# Patient Record
Sex: FEMALE | Race: WHITE | NOT HISPANIC OR LATINO | Employment: FULL TIME | URBAN - METROPOLITAN AREA
[De-identification: names, ages, dates, MRNs, and addresses within clinical notes are randomized per-mention and may not be internally consistent; named-entity substitution may affect disease eponyms.]

---

## 2019-01-12 ENCOUNTER — APPOINTMENT (OUTPATIENT)
Dept: CT IMAGING | Facility: CLINIC | Age: 39
End: 2019-01-12
Payer: COMMERCIAL

## 2019-01-12 ENCOUNTER — HOSPITAL ENCOUNTER (EMERGENCY)
Facility: CLINIC | Age: 39
Discharge: HOME | End: 2019-01-12
Attending: EMERGENCY MEDICINE | Admitting: EMERGENCY MEDICINE
Payer: COMMERCIAL

## 2019-01-12 VITALS
BODY MASS INDEX: 31.84 KG/M2 | HEIGHT: 69 IN | SYSTOLIC BLOOD PRESSURE: 135 MMHG | DIASTOLIC BLOOD PRESSURE: 82 MMHG | WEIGHT: 215 LBS | HEART RATE: 104 BPM | TEMPERATURE: 98.1 F | OXYGEN SATURATION: 97 % | RESPIRATION RATE: 18 BRPM

## 2019-01-12 DIAGNOSIS — R19.7 NAUSEA VOMITING AND DIARRHEA: ICD-10-CM

## 2019-01-12 DIAGNOSIS — R11.2 NAUSEA VOMITING AND DIARRHEA: ICD-10-CM

## 2019-01-12 LAB
ALBUMIN SERPL-MCNC: 3.7 G/DL (ref 3.4–5)
ALBUMIN UR-MCNC: 30 MG/DL
ALP SERPL-CCNC: 79 U/L (ref 40–150)
ALT SERPL W P-5'-P-CCNC: 65 U/L (ref 0–50)
ANION GAP SERPL CALCULATED.3IONS-SCNC: 9 MMOL/L (ref 3–14)
ANISOCYTOSIS BLD QL SMEAR: SLIGHT
APPEARANCE UR: CLEAR
AST SERPL W P-5'-P-CCNC: 67 U/L (ref 0–45)
B-HCG FREE SERPL-ACNC: <5 IU/L
BASOPHILS # BLD AUTO: 0 10E9/L (ref 0–0.2)
BASOPHILS NFR BLD AUTO: 0.3 %
BILIRUB SERPL-MCNC: 0.7 MG/DL (ref 0.2–1.3)
BILIRUB UR QL STRIP: NEGATIVE
BUN SERPL-MCNC: 11 MG/DL (ref 7–30)
CALCIUM SERPL-MCNC: 8 MG/DL (ref 8.5–10.1)
CHLORIDE SERPL-SCNC: 102 MMOL/L (ref 94–109)
CO2 SERPL-SCNC: 22 MMOL/L (ref 20–32)
COLOR UR AUTO: YELLOW
CREAT SERPL-MCNC: 0.54 MG/DL (ref 0.52–1.04)
DIFFERENTIAL METHOD BLD: ABNORMAL
EOSINOPHIL # BLD AUTO: 0 10E9/L (ref 0–0.7)
EOSINOPHIL NFR BLD AUTO: 0 %
ERYTHROCYTE [DISTWIDTH] IN BLOOD BY AUTOMATED COUNT: 18.2 % (ref 10–15)
GFR SERPL CREATININE-BSD FRML MDRD: >90 ML/MIN/{1.73_M2}
GLUCOSE SERPL-MCNC: 185 MG/DL (ref 70–99)
GLUCOSE UR STRIP-MCNC: NEGATIVE MG/DL
HCT VFR BLD AUTO: 34.4 % (ref 35–47)
HGB BLD-MCNC: 9.3 G/DL (ref 11.7–15.7)
HGB UR QL STRIP: ABNORMAL
IMM GRANULOCYTES # BLD: 0.1 10E9/L (ref 0–0.4)
IMM GRANULOCYTES NFR BLD: 0.8 %
KETONES UR STRIP-MCNC: 20 MG/DL
LEUKOCYTE ESTERASE UR QL STRIP: NEGATIVE
LIPASE SERPL-CCNC: 82 U/L (ref 73–393)
LYMPHOCYTES # BLD AUTO: 0.3 10E9/L (ref 0.8–5.3)
LYMPHOCYTES NFR BLD AUTO: 5.5 %
MCH RBC QN AUTO: 18.2 PG (ref 26.5–33)
MCHC RBC AUTO-ENTMCNC: 27 G/DL (ref 31.5–36.5)
MCV RBC AUTO: 68 FL (ref 78–100)
MICROCYTES BLD QL SMEAR: PRESENT
MONOCYTES # BLD AUTO: 0.4 10E9/L (ref 0–1.3)
MONOCYTES NFR BLD AUTO: 6 %
MUCOUS THREADS #/AREA URNS LPF: PRESENT /LPF
NEUTROPHILS # BLD AUTO: 5.4 10E9/L (ref 1.6–8.3)
NEUTROPHILS NFR BLD AUTO: 87.4 %
NITRATE UR QL: NEGATIVE
NRBC # BLD AUTO: 0 10*3/UL
NRBC BLD AUTO-RTO: 0 /100
PH UR STRIP: 5 PH (ref 5–7)
PLATELET # BLD AUTO: 282 10E9/L (ref 150–450)
PLATELET # BLD EST: ABNORMAL 10*3/UL
POTASSIUM SERPL-SCNC: 3.5 MMOL/L (ref 3.4–5.3)
PROT SERPL-MCNC: 7.6 G/DL (ref 6.8–8.8)
RBC # BLD AUTO: 5.1 10E12/L (ref 3.8–5.2)
RBC #/AREA URNS AUTO: 1 /HPF (ref 0–2)
SODIUM SERPL-SCNC: 133 MMOL/L (ref 133–144)
SOURCE: ABNORMAL
SP GR UR STRIP: 1.03 (ref 1–1.03)
SQUAMOUS #/AREA URNS AUTO: <1 /HPF (ref 0–1)
UROBILINOGEN UR STRIP-MCNC: 0 MG/DL (ref 0–2)
WBC # BLD AUTO: 6.1 10E9/L (ref 4–11)
WBC #/AREA URNS AUTO: 1 /HPF (ref 0–5)

## 2019-01-12 PROCEDURE — 74177 CT ABD & PELVIS W/CONTRAST: CPT

## 2019-01-12 PROCEDURE — 25000128 H RX IP 250 OP 636: Performed by: EMERGENCY MEDICINE

## 2019-01-12 PROCEDURE — 99285 EMERGENCY DEPT VISIT HI MDM: CPT | Mod: 25

## 2019-01-12 PROCEDURE — 96361 HYDRATE IV INFUSION ADD-ON: CPT

## 2019-01-12 PROCEDURE — 83690 ASSAY OF LIPASE: CPT | Performed by: EMERGENCY MEDICINE

## 2019-01-12 PROCEDURE — 81001 URINALYSIS AUTO W/SCOPE: CPT | Performed by: EMERGENCY MEDICINE

## 2019-01-12 PROCEDURE — 93005 ELECTROCARDIOGRAM TRACING: CPT

## 2019-01-12 PROCEDURE — 85025 COMPLETE CBC W/AUTO DIFF WBC: CPT | Performed by: EMERGENCY MEDICINE

## 2019-01-12 PROCEDURE — 25000132 ZZH RX MED GY IP 250 OP 250 PS 637: Performed by: EMERGENCY MEDICINE

## 2019-01-12 PROCEDURE — 84702 CHORIONIC GONADOTROPIN TEST: CPT

## 2019-01-12 PROCEDURE — 96375 TX/PRO/DX INJ NEW DRUG ADDON: CPT

## 2019-01-12 PROCEDURE — 80053 COMPREHEN METABOLIC PANEL: CPT | Performed by: EMERGENCY MEDICINE

## 2019-01-12 PROCEDURE — 96374 THER/PROPH/DIAG INJ IV PUSH: CPT | Mod: 59

## 2019-01-12 RX ORDER — LOPERAMIDE HCL 2 MG
4 CAPSULE ORAL ONCE
Status: COMPLETED | OUTPATIENT
Start: 2019-01-12 | End: 2019-01-12

## 2019-01-12 RX ORDER — ONDANSETRON 4 MG/1
4 TABLET, ORALLY DISINTEGRATING ORAL EVERY 8 HOURS PRN
Qty: 10 TABLET | Refills: 0 | Status: SHIPPED | OUTPATIENT
Start: 2019-01-12 | End: 2019-01-15

## 2019-01-12 RX ORDER — HYDROMORPHONE HYDROCHLORIDE 1 MG/ML
0.5 INJECTION, SOLUTION INTRAMUSCULAR; INTRAVENOUS; SUBCUTANEOUS
Status: DISCONTINUED | OUTPATIENT
Start: 2019-01-12 | End: 2019-01-12 | Stop reason: HOSPADM

## 2019-01-12 RX ORDER — KETOROLAC TROMETHAMINE 30 MG/ML
30 INJECTION, SOLUTION INTRAMUSCULAR; INTRAVENOUS ONCE
Status: COMPLETED | OUTPATIENT
Start: 2019-01-12 | End: 2019-01-12

## 2019-01-12 RX ORDER — LOPERAMIDE HYDROCHLORIDE 2 MG/1
2 TABLET ORAL 4 TIMES DAILY PRN
Qty: 30 TABLET | Refills: 0 | Status: SHIPPED | OUTPATIENT
Start: 2019-01-12 | End: 2019-01-22

## 2019-01-12 RX ORDER — IOPAMIDOL 755 MG/ML
500 INJECTION, SOLUTION INTRAVASCULAR ONCE
Status: COMPLETED | OUTPATIENT
Start: 2019-01-12 | End: 2019-01-12

## 2019-01-12 RX ORDER — ONDANSETRON 2 MG/ML
4 INJECTION INTRAMUSCULAR; INTRAVENOUS
Status: DISCONTINUED | OUTPATIENT
Start: 2019-01-12 | End: 2019-01-12 | Stop reason: HOSPADM

## 2019-01-12 RX ADMIN — KETOROLAC TROMETHAMINE 30 MG: 30 INJECTION, SOLUTION INTRAMUSCULAR at 19:34

## 2019-01-12 RX ADMIN — IOPAMIDOL 100 ML: 755 INJECTION, SOLUTION INTRAVENOUS at 19:05

## 2019-01-12 RX ADMIN — SODIUM CHLORIDE 65 ML: 9 INJECTION, SOLUTION INTRAVENOUS at 19:05

## 2019-01-12 RX ADMIN — ONDANSETRON 4 MG: 2 INJECTION INTRAMUSCULAR; INTRAVENOUS at 16:56

## 2019-01-12 RX ADMIN — SODIUM CHLORIDE 1000 ML: 9 INJECTION, SOLUTION INTRAVENOUS at 16:58

## 2019-01-12 RX ADMIN — LOPERAMIDE HYDROCHLORIDE 4 MG: 2 CAPSULE ORAL at 20:02

## 2019-01-12 RX ADMIN — Medication 0.5 MG: at 16:58

## 2019-01-12 SDOH — HEALTH STABILITY: MENTAL HEALTH: HOW OFTEN DO YOU HAVE A DRINK CONTAINING ALCOHOL?: NEVER

## 2019-01-12 ASSESSMENT — ENCOUNTER SYMPTOMS
BLOOD IN STOOL: 0
ABDOMINAL PAIN: 1
DIARRHEA: 1
NAUSEA: 1
VOMITING: 1
FEVER: 0
FATIGUE: 1

## 2019-01-12 ASSESSMENT — MIFFLIN-ST. JEOR: SCORE: 1719.61

## 2019-01-12 NOTE — ED TRIAGE NOTES
ABCs intact. Pt c/o fever, abdominal pain, n/v/d x 1 week. Denies black or bloody vomit or stool. Denies urinary symptoms.

## 2019-01-12 NOTE — ED AVS SNAPSHOT
[facility] Emergency Department  [address]  [facility] [address]  Phone:  [phone]  Fax:  [phone]                                    Sherie Carpenter   MRN: [id number]    Department:  [facility] Emergency Department   Date of Visit:  1/12/2019           After Visit Summary Signature Page    I have received my discharge instructions, and my questions have been answered. I have discussed any challenges I see with this plan with the nurse or doctor.    ..........................................................................................................................................  Patient/Patient Representative Signature      ..........................................................................................................................................  Patient Representative Print Name and Relationship to Patient    ..................................................               ................................................  Date                                   Time    ..........................................................................................................................................  Reviewed by Signature/Title    ...................................................              ..............................................  Date                                               Time          22EPIC Rev 08/18

## 2019-01-12 NOTE — ED PROVIDER NOTES
"  History     Chief Complaint:  Abdominal Pain    HPI   Sherie Carpenter is a 38 year old female who presents with her  to the ED for evaluation of abdominal pain. The patient reports she developed diarrhea (watery and looser stools) a week ago with nausea and vomiting yesterday. She has also been feeling fatigued with \"disorientation\" yesterday. Her diarrhea has been constant from 8:00AM-3:00PM today. The patient notes she developed intermittent stabbing abdominal pain at 3:00AM today. She has not eaten since 8:00PM yesterday and only had 4 sips of water today. She has not taken medication for her symptoms. The patient's  reports she ate vegetable soup for dinner at 6:00PM yesterday. She vomited up the food with green bile afterwards. She was seen at [facility] urgent care today and advised to visit the ED. Her temp at urgent care was 101. The patient denies any recent antibiotics, travel, hospitalization, sick contacts, hematemesis, blood in stool, mucousy stool, or urinary symptoms. Of note, the patient reports she stopped taking her Metformin 2 days ago after a recent increase in dose. She thought her diarrhea was due to the Metformin because she has a history of diarrhea with change in Metformin dose before.     Allergies:  No known drug allergies    Medications:    Metformin  Levothyroxine      Past Medical History:    Prediabetes   Hypothyroidism      Past Surgical History:         Family History:    History reviewed. No pertinent family history.     Social History:  Smoking status: Never smoker    Alcohol use: No  Presents to ED with        Review of Systems   Constitutional: Positive for fatigue. Negative for fever.   Gastrointestinal: Positive for abdominal pain, diarrhea, nausea and vomiting. Negative for blood in stool.   Genitourinary: Negative.    All other systems reviewed and are negative.    Physical Exam     Patient Vitals for the past 24 hrs:   BP Temp " "Temp src Pulse Resp SpO2 Height Weight   01/12/19 1830 135/82 -- -- 104 -- 97 % -- --   01/12/19 1800 123/76 -- -- 101 -- 92 % -- --   01/12/19 1730 121/80 -- -- 108 -- 94 % -- --   01/12/19 1700 138/83 -- -- -- -- -- -- --   01/12/19 1627 133/89 98.1  F (36.7  C) Temporal 119 18 96 % 1.753 m (5' 9\") 97.5 kg (215 lb)     Physical Exam  General: Alert, no acute distress; nontoxic appearing  Neuro:  PERRL.  EOMI.  Gait stable, no focal deficits  HEENT:  Dr mucous membranes.  Posterior oropharynx clear, no exudates.  Conjunctiva normal.   CV:  Tachycardic, regular rhythm, no m/r/g, skin warm and well perfused  Pulm:  CTAB, no wheezes/ronchi/rales.  No acute distress, breathing comfortably  GI:  Soft, mild epigastric/suprapubic tenderness, nondistended.  No rebound or guarding.  Normal bowel sounds  MSK:  Moving all extremities.  No focal areas of edema, erythema, or tenderness  Skin:  WWP, no rashes, no lower extremity edema, skin color normal, no diaphoresis  Psych:  Well-appearing, normal affect, regular speech, organized and appropriate thought content    Emergency Department Course     Imaging:  Radiographic findings were communicated with the patient and family who voiced understanding of the findings.    Abd/Pelvis CT, IV Contrast Only Trauma/AAA  IMPRESSION:  1. Small fluid distends a few small bowel loops in the colon. Question  if this relates to an enteritis. No evidence for diverticulitis or  other acute bowel abnormality.  2. Splenomegaly.  3. Nonspecific right adrenal nodule.  As read by Radiology.     Laboratory:  ISTAT HCG Pregnancy POCT (1654): <5.0    Lipase: 82  CBC: HGB 9.3(L), o/w WNL (WBC 6.1, )  CMP: Glucose 85(H), Calcium 8.0(L), ALT 65(H), AST 67(H), o/w WNL (Creatinine 0.54)     UA: Urineketon 20, Blood small, Protein albumin 30, Mucous present, o/w Negative     Interventions:  1656: Zofran 4mg IV  1658: NS 1L Bolus IV  1658: Dilaudid 0.5mg IV  1934: Toradol 30mg IV  2002: Imodium 4mg " PO    Emergency Department Course:  Past medical records, nursing notes, and vitals reviewed.  1636: I performed an exam of the patient and obtained history, as documented above.    IV inserted and blood drawn.    The patient was sent for an abdomen pelvis CT while in the emergency department, findings above.    1927: I rechecked the patient. Explained findings to patient and .    Findings and plan explained to the Patient and spouse. Patient discharged home with instructions regarding supportive care, medications, and reasons to return. The importance of close follow-up was reviewed.     Impression & Plan      Medical Decision Making:  Sherie Carpenter is a 38 year old female who presents for evaluation of nausea, vomiting, and, diarrhea with abdominal pain in a nonfocal abdominal exam.  There are no ill contacts.  I considered a broad differential diagnosis for this patient including viral gastroenteritis, bacterial infection of the large intestine (salmonella, shigella, campylobacter, e coli, etc), bowel obstruction, intra-abdominal infection such as colitis, food poisoning, cholecystitis, UTI, pyelonephritis, appendicitis, etc.  There are no signs of worrisome intra-abdominal pathologies detected during the visit today; CT scan shows enteritis.  She has a completely benign abdominal exam without rebound, guarding, or marked tenderness to palpation.  Supportive outpatient management is therefore indicated.    No indication for stool studies at this time. It was discussed to return to the ED for blood in stool, increasing pain, or fevers more than 102.   Feels much improved after interventions in ED.     Diagnosis:    ICD-10-CM   1. Nausea vomiting and diarrhea R11.2    R19.7     Disposition: Patient discharged to home with       Discharge Medications:  loperamide 2 MG tablet  Commonly known as:  IMODIUM A-D  2 mg, Oral, 4 TIMES DAILY PRN     ondansetron 4 MG ODT tab  Commonly known as:   ZOFRAN ODT  4 mg, Oral, EVERY 8 HOURS PRN       Magdalena Han  1/12/2019   [facility] EMERGENCY DEPARTMENT    Scribe Disclosure:  I, Magdalena Han, am serving as a scribe at 4:36 PM on 1/12/2019 to document services personally performed by Yony Newman MD based on my observations and the provider's statements to me.        Yony Newman MD  01/12/19 2874

## 2019-01-12 NOTE — LETTER
January 12, 2019      To Whom It May Concern:      Sherie Radha Carpenter was seen in our Emergency Department today, 01/12/19.  I expect her condition to improve over the next 1-3 days.  She may return to work when improved.    Sincerely,        Abner Michael RN

## 2019-01-13 NOTE — DISCHARGE INSTRUCTIONS
Discharge Instructions  Adult Diarrhea    You have been seen today for diarrhea (loose stools). This is usually caused by a virus, but some bacteria, parasites, medicines, or other medical conditions can cause similar symptoms. At this time your provider does not find that your diarrhea is a sign of anything dangerous or life-threatening. However, sometimes the signs of serious illness do not show up right away. If you have new or worse symptoms, you may need to be seen again in the Emergency Department or by your primary provider.     Generally, every Emergency Department visit should have a follow-up clinic visit with either a primary or a specialty clinic/provider. Please follow-up as instructed by your emergency provider today.    Return to the Emergency Department if:  You feel you are getting dehydrated, such as being very thirsty, not urinating (peeing) like usual, or feeling faint or lightheaded.   You develop a new fever.  You have abdominal (belly) pain that seems worse than cramps, is in one spot, or is getting worse over time.   You have blood in your stool or your stool becomes black.  (Remember that if you take Pepto-Bismol , this will turn your stool black).   You feel very weak.    What can I do to help myself?  The most important thing to do is to drink clear liquids.   It is best to have only small, frequent sips of liquids. Drinking too much at once may cause more diarrhea. You should also replace minerals, sodium and potassium lost with diarrhea. Pedialyte  and sports drinks can help you replace these minerals. You can also drink clear liquids such as water, weak tea, apple juice, and 7-Up . Avoid acidic liquids (orange juice), caffeine (coffee) or alcohol. Milk products will make the diarrhea worse.  Eat bland (plain) foods. Soda crackers, toast, plain noodles, gelatin, applesauce and bananas are good first choices. Avoid foods that have acid, are spicy, fatty or fibrous (such as meats, coarse  "grains, vegetables). You may start eating these foods again in about 3 days when you are better.   Sometimes treatment includes prescription medicine to prevent diarrhea. If your provider prescribes these for you, take them as directed.   Nonprescription medicine is available for the treatment of diarrhea and can be very effective. If you use it, make sure you use the dose recommended on the package. Check with your healthcare provider before you use any medicine for diarrhea.   Do not take ibuprofen, or other nonsteroidal anti-inflammatory medicines, without checking with your healthcare provider.   Probiotics: If you have been given an antibiotic, you may want to also take a probiotic pill or eat yogurt with live cultures. Probiotics have \"good bacteria\" to help your intestines stay healthy. Studies have shown that probiotics help prevent diarrhea and other intestine problems (including C. diff infection) when you take antibiotics. You can buy these without a prescription in the pharmacy section of the store.   If you were given a prescription for medicine here today, be sure to read all of the information (including the package insert) that comes with your prescription.  This will include important information about the medicine, its side effects, and any warnings that you need to know about.  The pharmacist who fills the prescription can provide more information and answer questions you may have about the medicine.  If you have questions or concerns that the pharmacist cannot address, please call or return to the Emergency Department.  Remember that you can always come back to the Emergency Department if you are not able to see your regular provider in the amount of time listed above, if you get any new symptoms, or if there is anything that worries you.      Discharge Instructions  Vomiting    You have been seen today for vomiting (throwing up). This is usually caused by a virus, but some bacteria, parasites, " medicines or other medical conditions can cause similar symptoms. At this time your provider does not find that your vomiting is a sign of anything dangerous or life-threatening. However, sometimes the signs of serious illness do not show up right away. If you have new or worse symptoms, you may need to be seen again in the Emergency Department or by your primary provider. Remember that serious problems like appendicitis can start as vomiting.    Generally, every Emergency Department visit should have a follow-up clinic visit with either a primary or a specialty clinic/provider. Please follow-up as instructed by your emergency provider today.    Return to the Emergency Department if:  You keep vomiting and you are not able to keep liquids down.   You feel you are getting dehydrated, such as being very thirsty, not urinating (peeing) at least every 8-12 hours, or feeling faint or lightheaded.   You develop a new fever, or your fever continues for more than 2 days.   You have abdominal (belly pain) that seems worse than cramps, is in one spot, or is getting worse over time. Appendicitis usually causes pain in the right lower abdomen (to the right and below your belly button) so watch for pain in this location.  You have blood in your vomit or stools.   You feel very weak.  You are not starting to improve within 24 hours of your visit here.     What can I do to help myself?  The most important thing to do is to drink clear liquids. If you have been vomiting a lot, it is best to have only small, frequent sips of liquids. Drinking too much at once may cause more vomiting. If you are vomiting often, you must replace minerals, sodium and potassium lost with your illness. Pedialyte  is the best available rehydration liquid but some find that it doesn?t taste good so sports drinks are an alterative. You can also drink clear liquids such as water, weak tea, apple juice, and 7-Up . Avoid acid liquids (orange), caffeine  (coffee) or alcohol. Do not drink milk until you no longer have diarrhea (loose stools).   After liquids are staying down, you may start eating mild foods. Soda crackers, toast, plain noodles, gelatin, applesauce and bananas are good first choices. Avoid foods that have acid, are spicy, fatty or have a lot of fiber (such as meats, coarse grains, vegetables). You may start eating these foods again in about 3 days when you are better.   Sometimes treatment includes prescription medicine to prevent nausea (sick to your stomach) and vomiting. If your provider prescribes these for you, take them as directed.   Do not take ibuprofen, naproxen, or other nonsteroidal anti-inflammatory (NSAID) medicines without checking with your healthcare provider.     If you were given a prescription for medicine here today, be sure to read all of the information (including the package insert) that comes with your prescription.  This will include important information about the medicine, its side effects, and any warnings that you need to know about.  The pharmacist who fills the prescription can provide more information and answer questions you may have about the medicine.  If you have questions or concerns that the pharmacist cannot address, please call or return to the Emergency Department.     Remember that you can always come back to the Emergency Department if you are not able to see your regular provider in the amount of time listed above, if you get any new symptoms, or if there is anything that worries you.

## 2023-06-01 ENCOUNTER — TRANSFERRED RECORDS (OUTPATIENT)
Dept: SURGERY | Facility: CLINIC | Age: 43
End: 2023-06-01
Payer: COMMERCIAL

## 2023-06-02 ENCOUNTER — OFFICE VISIT (OUTPATIENT)
Dept: SURGERY | Facility: CLINIC | Age: 43
End: 2023-06-02
Payer: COMMERCIAL

## 2023-06-02 ENCOUNTER — TELEPHONE (OUTPATIENT)
Dept: SURGERY | Facility: CLINIC | Age: 43
End: 2023-06-02

## 2023-06-02 VITALS
OXYGEN SATURATION: 97 % | SYSTOLIC BLOOD PRESSURE: 104 MMHG | WEIGHT: 200 LBS | HEART RATE: 69 BPM | BODY MASS INDEX: 29.62 KG/M2 | RESPIRATION RATE: 14 BRPM | DIASTOLIC BLOOD PRESSURE: 70 MMHG | HEIGHT: 69 IN

## 2023-06-02 DIAGNOSIS — K80.20 GALLSTONES: Primary | ICD-10-CM

## 2023-06-02 PROCEDURE — 99204 OFFICE O/P NEW MOD 45 MIN: CPT | Performed by: SURGERY

## 2023-06-02 RX ORDER — LEVOTHYROXINE SODIUM 175 UG/1
1 TABLET ORAL DAILY
COMMUNITY
Start: 2023-03-23

## 2023-06-02 RX ORDER — PROCHLORPERAZINE 25 MG/1
SUPPOSITORY RECTAL
COMMUNITY
Start: 2022-12-01

## 2023-06-02 RX ORDER — HYDROXYZINE HYDROCHLORIDE 25 MG/1
25 TABLET, FILM COATED ORAL PRN
COMMUNITY
Start: 2022-07-19

## 2023-06-02 RX ORDER — NAPROXEN 500 MG/1
500 TABLET ORAL PRN
COMMUNITY
Start: 2021-06-16

## 2023-06-02 RX ORDER — ESCITALOPRAM OXALATE 20 MG/1
1 TABLET ORAL
COMMUNITY
Start: 2023-02-16

## 2023-06-02 RX ORDER — BUSPIRONE HYDROCHLORIDE 5 MG/1
5 TABLET ORAL 2 TIMES DAILY
COMMUNITY
Start: 2023-06-01

## 2023-06-02 RX ORDER — GLIMEPIRIDE 2 MG/1
TABLET ORAL
COMMUNITY
Start: 2023-03-23

## 2023-06-02 RX ORDER — TRAZODONE HYDROCHLORIDE 50 MG/1
50 TABLET, FILM COATED ORAL PRN
COMMUNITY
Start: 2022-10-18

## 2023-06-02 RX ORDER — PROCHLORPERAZINE 25 MG/1
SUPPOSITORY RECTAL
COMMUNITY
Start: 2023-03-31

## 2023-06-02 RX ORDER — DICYCLOMINE HCL 20 MG
20 TABLET ORAL PRN
COMMUNITY

## 2023-06-02 RX ORDER — METOPROLOL SUCCINATE 100 MG/1
1 TABLET, EXTENDED RELEASE ORAL DAILY
COMMUNITY
Start: 2021-12-16

## 2023-06-02 RX ORDER — FERROUS SULFATE 325(65) MG
325 TABLET ORAL 2 TIMES DAILY
COMMUNITY
Start: 2023-03-30

## 2023-06-02 RX ORDER — BUPROPION HYDROCHLORIDE 300 MG/1
300 TABLET ORAL DAILY
COMMUNITY
Start: 2023-06-01

## 2023-06-02 RX ORDER — LISINOPRIL 5 MG/1
1 TABLET ORAL
COMMUNITY
Start: 2023-02-16

## 2023-06-02 RX ORDER — SEMAGLUTIDE 1.34 MG/ML
2 INJECTION, SOLUTION SUBCUTANEOUS WEEKLY
COMMUNITY
Start: 2023-02-26

## 2023-06-02 RX ORDER — DAPAGLIFLOZIN 10 MG/1
1 TABLET, FILM COATED ORAL DAILY
COMMUNITY
Start: 2022-10-24

## 2023-06-02 RX ORDER — PROCHLORPERAZINE 25 MG/1
SUPPOSITORY RECTAL
COMMUNITY
Start: 2023-03-30

## 2023-06-02 RX ORDER — METFORMIN HCL 500 MG
2000 TABLET, EXTENDED RELEASE 24 HR ORAL
COMMUNITY
Start: 2022-01-27

## 2023-06-02 RX ORDER — PRAVASTATIN SODIUM 10 MG
10 TABLET ORAL AT BEDTIME
COMMUNITY
Start: 2022-02-19

## 2023-06-02 RX ORDER — ESCITALOPRAM OXALATE 5 MG/1
5 TABLET ORAL DAILY
COMMUNITY
Start: 2021-12-16 | End: 2023-06-02

## 2023-06-02 RX ORDER — INDOCYANINE GREEN AND WATER 25 MG
2.5 KIT INJECTION ONCE
Status: CANCELLED | OUTPATIENT
Start: 2023-06-02 | End: 2023-06-02

## 2023-06-02 NOTE — LETTER
"June 2, 2023          Maria Isabel Izqiuerdo MD  [address]  [address] [address]      RE:   Sherie Carpenter 1980      Dear Colleague,    Thank you for referring your patient, Sherie Carpenter, to [facility].  Please see a copy of my visit note below.    Chief complaint:  Abdominal pain, epigastric    HPI:  This patient is a 42 year old female who presents with gallstones identified on multiple ultrasounds and CT scans over the years.  She was told she should seek a surgical opinion.  The patient does note that she has had episodic \"heartburn\" which causes her severe epigastric pain going through to her back.  This comes on quite suddenly, and is gone by morning.  She has intermittent constipation and diarrhea.    Past Medical History:  Has a past medical history of Diabetes (H), HTN (hypertension), Hypothyroidism, and Sleep apnea.    Family History:  History reviewed. No pertinent family history.    Review of Systems:  The 10 point Review of Systems is negative other than noted in the HPI and above.    Physical Exam:  General - This is a well developed, well nourished female in no apparent distress.  HEENT - Normocephalic, atraumatic.  No scleral icterus.  Neck - supple without masses  Lungs - clear to ascultation.    Heart - Regular rate & rhythm without murmur      Abdomen:   soft, non-distended with minimal tenderness noted in the right upper quadrant.   Extremities - warm without edema  Neurologic - nonfocal    Relevant labs:  Normal liver function tests by report    Imaging:  Gallstones seen on an outside CT scan done in 2019.  Images are not available to me.  She reports she has also had multiple ultrasounds showing gallstones.    Assessment and Plan:  This is a patient with episodic epigastric pain going through to her back.  This clinically sounds much more like gallbladder attacks than heartburn.  I recommended laparoscopic cholecystectomy and we " discussed the procedure, along with its risks and complications, in detail.  The patient would like to proceed and we will work on getting that arranged for her.    Again, thank you for allowing me to participate in the care of your patient.      Sincerely,      Cm Callahan MD  [facility]

## 2023-06-02 NOTE — PROGRESS NOTES
"Chief complaint:  Abdominal pain, epigastric    HPI:  This patient is a 42 year old female who presents with gallstones identified on multiple ultrasounds and CT scans over the years.  She was told she should seek a surgical opinion.  The patient does note that she has had episodic \"heartburn\" which causes her severe epigastric pain going through to her back.  This comes on quite suddenly, and is gone by morning.  She has intermittent constipation and diarrhea.    Past Medical History:   has a past medical history of Diabetes (H), HTN (hypertension), Hypothyroidism, and Sleep apnea.    Past Surgical History:  Past Surgical History:   Procedure Laterality Date      SECTION  2013     D & C       D & C       TUBAL LIGATION          Social History:  Social History     Socioeconomic History     Marital status:      Spouse name: Not on file     Number of children: Not on file     Years of education: Not on file     Highest education level: Not on file   Occupational History     Not on file   Tobacco Use     Smoking status: Never     Passive exposure: Never     Smokeless tobacco: Never   Vaping Use     Vaping status: Not on file   Substance and Sexual Activity     Alcohol use: Yes     Comment: 5 per year     Drug use: No     Sexual activity: Not on file   Other Topics Concern     Not on file   Social History Narrative     Not on file     Social Determinants of Health     Financial Resource Strain: Not on file   Food Insecurity: Not on file   Transportation Needs: Not on file   Physical Activity: Not on file   Stress: Not on file   Social Connections: Not on file   Intimate Partner Violence: Not on file   Housing Stability: Not on file        Family History:  History reviewed. No pertinent family history.    Review of Systems:  The 10 point Review of Systems is negative other than noted in the HPI and above.    Physical Exam:  General - This is a well developed, well nourished female in no " apparent distress.  HEENT - Normocephalic, atraumatic.  No scleral icterus.  Neck - supple without masses  Lungs - clear to ascultation.    Heart - Regular rate & rhythm without murmur  Abdomen:   soft, non-distended with minimal tenderness noted in the right upper quadrant.   Extremities - warm without edema  Neurologic - nonfocal    Relevant labs:  Normal liver function tests by report    Imaging:  Gallstones seen on an outside CT scan done in 2019.  Images are not available to me.  She reports she has also had multiple ultrasounds showing gallstones.    Assessment and Plan:  This is a patient with episodic epigastric pain going through to her back.  This clinically sounds much more like gallbladder attacks than heartburn.  I recommended laparoscopic cholecystectomy and we discussed the procedure, along with its risks and complications, in detail.  The patient would like to proceed and we will work on getting that arranged for her.    A total of 45 minutes was spent today in chart review, patient examination and discussion, and documentation.    Cm Callahan MD  [facility]    Please route or send letter to:  Referring Provider

## 2023-06-02 NOTE — TELEPHONE ENCOUNTER
Type of surgery: LAPAROSCOPIC CHOLECYSTECTOMY   Location of surgery: [facility]  Date and time of surgery: 7-27-23, 1 PM   Surgeon: DR MARAVILLA   Pre-Op Appt Date: PATIENT TO SCHEDULE   Post-Op Appt Date: NA    Packet sent out: GIVEN TO PATIENT   Pre-cert/Authorization completed:  Not Applicable  Date: 6-2-23      LAPAROSCOPIC CHOLECYSTECTOMY   GENERAL   PT INST TO HAVE H&P  60 MINS REQ  PA ASSIST [facility]  ALW   (75 mins average)

## 2023-07-21 NOTE — H&P (VIEW-ONLY)
Clinical Nursing Notes  The following clinical notes were reviewed by me prior to patient encounter:  Nursing Notes:   Nathaly Schwartz  7/21/2023  8:10 AM  Sign at exiting of workspace  Sherie Long is a 42 y.o. female (1980) who presents for preop evaluation undergoing CHOLECYSTECTOMY, LAPAROSCOPIC.    Date of Surgery: 7/27/23  Surgical Specialty: General  Dr. Cm Callahan  [facility]: [facility]   Fax number: [phone]  Surgery type: outpatient  Primary Physician: Maria Isabel Izquierdo SUYAPA Efren JENSEN  7/21/2023   8:09 AM  Chief Complaint   Patient presents with     Preoperative Exam       Additional visit information (chief complaint/health maintenance) shared by patient: no    There are no preventive care reminders to display for this patient.    Health maintenance reviewed with patient Yes    Patient presents for an in-person office visit: alone  Communication Method: Patient is active on [employer] and has been instructed that results/communications will be made via [employer]  If a phone call is needed, the preferred number is:  Mobile   Home Phone [phone]   Mobile [phone]     May we leave a detailed message at this number? No      Nathaly DALE Efren JENSEN  7/21/2023   8:09 AM      Provider Notes  Subjective:    Sherie Long is a 42 y.o. female with hypertension, diabetes, hypothyroidism, anemia, anxiety, and gallblader calculus who presents for preoperative consultation at the request of Dr. Cm Tipton in preparation for below-referenced procedure.    Date:  07/27/2023  Surgery:  Laparoscopic cholecystectomy  Surgeon:  Cm Callahan M.D.  Location:  [facility]  Anesthesia:  General    History of Present Illness:  Sherie has had gallstones identified on multiple ultrasounds and CT scans over the years. She reported to Dr. Callahan at her surgical consultation on 6/2/2023 that she has had episodic severe epigastric pain  going through to her back which comes on quite suddenly, and is gone by morning. Cholecystectomy was advised.    Preoperative Review:  FUNCTIONAL STATUS: (e.g. ambulatory with walker/cane, etc.): No ambulatory aids needed    CARDIOVASCULAR:       PAD: No       Cerebrovascular: No       Coronary artery disease: No       Angina: No       History of Myocardial Infarction: No       Congestive Heart failure: No             Most recent LVEF: N/A       CABG/Stents: No       Aortic Stenosis: No       Pacemaker: No       Implanted defribillator: No       Atrial Fibrillation: No       Deep vein thrombosis / Pulmonary embolism: No    RESPIRATORY:       Asthma: No       Chronic obstructive pulmonary disease: No       Sleep Apnea: JUST DIAGNOSED THIS WEEK       CPap: Hasn't yet had time to be fitted for CPap Machine    BLEEDING RISK / ANTICOAGULATION       Aspirin: No       NSAID: None       Warfarin: No       DOAC: No       Personal/Family History of bleeding/clotting disorder: No    ENDOCRINE:       Diabetes: YES with A1c 7.2 on 3/29/2023       Thyroid Disorder: YES - controlled on medication       Steroid Use: No       Adrenal Insufficiency Risk: No    GASTROINTESTINAL:       Hiatal Hernia: No       GERD: Maybe       Liver Disease/Hepatitis: No       Cirrhosis: No    RENAL:       Chronic Renal Disease: No    HABITS:       Tobacco:   Social History     Tobacco Use   Smoking Status Never     Passive exposure: Past   Smokeless Tobacco Never            Alcohol: Rarely at all    INFECTION RISK:       Endocarditis: No       HIV: No       COVID-19: Yes - in 2021       Last Tetanus vaccination:  02/16/2023    NEURO/PSYCH:       Dementia, Delirium, Encephalopathy: No       Seizure Disorder: No    ANESTHESIA HISORY:       Previous reaction to sedation: No    ALLERGIES:  No Known Allergies    MEDICATIONS:  Current Outpatient Medications   Medication Sig Dispense Refill     Blood-Glucose Meter (ACCU-CHEK AGUEDA PLUS METER) Dispense  glucose meter, test strips and lancets covered by the patient insurance. Test 4 times per day. 1 Device 0     buPROPion (WELLBUTRIN XL) 300 mg Extended-Release tablet Take 1 Tablet (300 mg) by mouth once daily. 90 Tablet 0     busPIRone (BUSPAR) 5 mg tablet Take 1 Tablet (5 mg) by mouth 2 times daily if needed for Anxiety. 60 Tablet 1     cholecalciferol (VITAMIN D3) 50,000 unit capsule Take 1 Capsule (50,000 units) by mouth once weekly for 8 doses. 8 Capsule 0     CPAP Full face mask with cushion x 1, Filters: Disposable x 1pk & Reusable x 1pk, Length of Need: 99 months, Frequency of use: Daily 1 unit 0     dapagliflozin (Farxiga) 10 mg tablet Take 1 Tablet (10 mg) by mouth once daily. 90 Each 1     Dexcom G6  for continuous blood glucose monitor (CGM) To be used to read blood sugars follow  directions. 1 Each 3     dicyclomine (BENTYL) 20 mg tablet Take 1 tablet by mouth once daily if needed for Other (Specify) (stomach upset). 90 tablet 5     escitalopram oxalate (LEXAPRO) 20 mg tablet Take 1 Tablet (20 mg) by mouth once daily. 90 Tablet 3     etonogestrel subdermal implant (NEXPLANON) 68 mg implant Inject 68 mg subcutaneous once daily.       ferrous sulfate, 65 mg elemental, tablet Take 1 Tablet (325 mg) by mouth two times daily with meals. 180 Tablet 0     glimepiride (AMARYL) 2 mg tablet Take 3 Tablets (6 mg) by mouth once daily with a meal. 360 Tablet 1     hydrOXYzine HCL (ATARAX) 25 mg tablet Take 1 Tablet (25 mg) by mouth every 8 hours if needed for Anxiety (sleep). 90 Tablet 1     levothyroxine (SYNTHROID) 175 mcg tablet TAKE ONE TABLET BY MOUTH EVERY DAY BEFORE BREAKFAST 90 Tablet 2     lisinopriL (PRINIVIL; ZESTRIL) 5 mg tablet Take 1 Tablet (5 mg) by mouth once daily. 90 Tablet 3     loperamide (IMODIUM) 2 mg capsule Take 4mg by mouth with 1st loose stool, then 2mg with each subsequent loose stool. Max 16 mg in 24 hrs 48 capsule 1     metFORMIN (GLUCOPHAGE XR) 500 mg  Extended-Release tablet Take 4 Tablets (2,000 mg) by mouth once daily. 360 Tablet 0     metoprolol succinate (TOPROL XL) 100 mg Sustained-Release tablet Take 1 Tablet (100 mg) by mouth once daily. 90 Tablet 3     OneTouch Delica Plus Lancet 33 gauge misc USE TO TEST FOUR TIMES A  Each 3     OneTouch Verio test strips strip USE TO TEST FOUR TIMES A  Strip 3     pravastatin (PRAVACHOL) 10 mg tablet TAKE ONE TABLET BY MOUTH AT BEDTIME 90 Tablet 2     semaglutide (Ozempic) 2 mg/dose (8 mg/3 mL) pen Inject 0.75 mL (2 mg) subcutaneous once weekly. 3 mL 3     sensor (Dexcom G6 Sensor) for continuous blood glucose monitor (CGM) CHANGE SENSOR EVERY 10 DAYS 9 Each 3     transmitter (Dexcom G6 Transmitter) for continuous blood glucose monitor (CGM) CHANGE TRANSMITTER EVERY THREE MONTHS. 1 Each 3     traZODone (DESYREL) 50 mg tablet TAKE ONE TO TWO TABLETS BY MOUTH AT BEDTIME AS NEEDED FOR SLEEP 180 Tablet 1     No current facility-administered medications for this visit.     Medications have been reviewed by me and are current to the best of my knowledge and ability.      PAST MEDICAL HX:  Patient Active Problem List   Diagnosis Code     Hypothyroidism (acquired) E03.9     Thyroid cyst E04.1     HTN (hypertension) I10     Controlled type 2 diabetes mellitus without complication, without long-term current use of insulin (HC) E11.9     Iron deficiency anemia D50.9     Adrenal nodule (HC) E27.8     Splenomegaly R16.1     Fatty infiltration of liver K76.0     Cholelithiasis K80.20     Calculus of gallbladder without cholecystitis K80.20     Uterine fibroid D25.9     Vitamin D deficiency E55.9     Anxiety F41.9     Microalbuminuria due to type 2 diabetes mellitus (HC) E11.29, R80.9     Myopia, bilateral H52.13     Diabetes type 2, no ocular involvement (HC) E11.9     Regular astigmatism, bilateral H52.223       PAST SURGICAL HX:  Past Surgical History:   . Laterality Date      SECTION       DILATATION AND  "CURETTAGE WITH HYSTEROSCOPIC ABLATION OF UTERUS WITH NOVASURE  11/11/2022    for excessive bleeding, [facility]       FAMILY MEDICAL HX:  Family History   Problem Relation Age of Onset     Thyroid cancer Maternal Grandmother      Diabetes Paternal Grandfather      ADD / ADHD Son        SOCIAL HX:  Health Care Directive: Yes   Nutrition Status: Good   Communication / Learning Barriers: None    REVIEW OF SYSTEM:   Comprehensive review of system is negative except as noted in HPI/PMH and below:  Fatigue due to sleeping poorly, anxiety, diarrhea.    PHYSICAL EXAMINATION:   VITALS:  /76 (Cuff Site: Left Arm, Position: Sitting, Cuff Size: Adult Regular)   Pulse 80   Ht 1.753 m (5' 9\")   Wt 85 kg (187 lb 6.4 oz)   BMI 27.67 kg/m    GENERAL:  Alert, No acute distress.   EYES:  Conjunctivae and lids are normal  NOSE:  External nose and otoscopy of membranes,septum and turbinates are normal in appearance  OROPHARYNGEAL:  Lips, teeth and gums appear normal  NECK:  No masses.  Trachea is midline.  No thyromegaly  RESPIRATORY:  Normal respiratory effort.  No use of accessory muscles.  Clear to auscultation.  CARDIOVASCULAR: RRR, S1S2, no murmur;  No carotid bruits.  Equal peripheral pulses.  No peripheral edema noted.  ABDOMEN:  No pain,no guarding, no rebound to palpation. No hepatosplenomegaly.  LYMPHATIC:  No cervical adenopathy noted.  SKIN:  Inspection: normal;  Masses, nodules: normal  NEUROLOGIC: No tremor  PSYCHIATRIC: Affect: euthymic      The Pre-Op Tool    Recommendations      Low Risk Procedure    Cardiac History  No history of coronary artery disease           Labs  HGB within last 30 days  PLT within last 30 days  Potassium within last 30 days  EKG  Not indicated  Stress Testing  Not indicated    * Testing recommendations are intended to assist, but not direct, clinical decisions.       *    Hold injectable, non-insulin diabetes medication on the day of the procedure  Do not take your oral diabetes " medication(s) on the day of the procedure.  Hold Lisinopril (Prinvil, Zestril) the evening before and/or morning of the procedure.  Continue taking Metoprolol (Lopressor, Toprol); be sure to take the evening before and/or morning of the procedure.  Hold Naproxen (Aleve) for 4 days prior to the procedure.  Take your other medications as usual prior to the procedure  Hold vitamins and/or supplements for 1 week prior to the procedure  Hold fish oil for 2 weeks prior to the procedure  Okay to take Acetaminophen (Tylenol) up until the procedure  Hold / avoid NSAIDs (e.g. ibuprofen, naproxen) prior to procedure: 2 days for ibuprofen (Advil) and 4 days for naproxen (Aleve).    * Medication recommendations are not intended to be exhaustive; they are limited to common medications that are potentially dangerous if incorrectly managed          Labs  * Data supports elimination of  routine  laboratory testing in favor of focused,  indicated  testing based on medical co-morbidities. A 2009 study randomized 1061 patients undergoing ambulatory, non-cataract surgery to routine or to indicated testing. Perioperative adverse events were similar (Anesthesia & Analgesia 2009;108:467-75; Anesthesiol. Clin. 2016 Mar;34(1):43-58).  EKG  * The ACC/AHA recommends against obtaining routine EKGs in patients undergoing low risk surgeries, a class IIa recommendation (JACC. 2014;64(21);e1-76).  RAAS Antagonist  * Hypotension during anesthesia is associated with continuing renin-angiotensin system (RAAS) antagonist. While it is unclear if holding RAAS antagonists reduces postoperative complications, in most circumstances our experts recommend holding RAAS antagonist 24 hours prior to surgery. (Postgrad Med J 2011;87:472-81; Anest 2017;126:16-27; BMC Anesthesiol 2018;18:26.)     Session ID: [id number]  Endnotes and bibliography available upon request: [email or web address]    Lab:  Results for orders placed or performed in visit on  07/21/23   BASIC METABOLIC PANEL   Result Value Ref Range Status    SODIUM 138 135 - 145 mmol/L Final    POTASSIUM 4.3 3.5 - 5.0 mmol/L Final    CHLORIDE 107 98 - 110 mmol/L Final    CO2,TOTAL 22 21 - 31 mmol/L Final    ANION GAP 9 5 - 18                 Final    GLUCOSE 142 (H) 70 - 99 mg/dL Final    CALCIUM 9.4 8.5 - 10.5 mg/dL Final    BUN 12 8 - 25 mg/dL Final    CREATININE 0.80 0.57 - 1.11 mg/dL Final    BUN/CREAT RATIO           15 10 - 20                 Final    eGFR >90 >90 mL/min/1.73m2 Final   CBC WITH AUTO DIFFERENTIAL   Result Value Ref Range Status    WHITE BLOOD COUNT         6.9 4.5 - 11.0 thou/cu mm Final    RED BLOOD COUNT           4.89 4.00 - 5.20 mil/cu mm Final    HEMOGLOBIN                11.9 (L) 12.0 - 16.0 g/dL Final    HEMATOCRIT                39.2 33.0 - 51.0 % Final    MCV                       80 80 - 100 fL Final    MCH                       24.3 (L) 26.0 - 34.0 pg Final    MCHC                      30.4 (L) 32.0 - 36.0 g/dL Final    RDW                       14.6 11.5 - 15.5 % Final    PLATELET COUNT            269 140 - 440 thou/cu mm Final    MPV                       9.2 6.5 - 11.0 fL Final    NRBC                      0.0 % Final    ABS NRBC 0.0 thou /cu mm Final    % NEUT 68.9 % Final    % LYMPH 20.3 % Final    % MONO 7.5 % Final    % EOS 2.2 % Final    % BASO 0.7 % Final    % IMMATURE GRAN (METAS,MYELOS,PROS) 0.4 % Final    ABSOLUTE NEUTROPHILS      4.7 1.7 - 7.0 thou/cu mm Final    ABSOLUTE LYMPHOCYTES      1.4 0.9 - 2.9 thou/cu mm Final    ABSOLUTE MONOCYTES        0.5 <0.9 thou/cu mm Final    ABSOLUTE EOSINOPHILS      0.2 <0.5 thou/cu mm Final    ABSOLUTE BASOPHILS        0.1 <0.3 thou/cu mm Final    ABSOLUTE IMMATURE GRANULOCYTES(METAS,MYELOS,PROS) 0.0 <0.3 thou/cu mm Final       Impression:    ICD-10-CM   1. Preop examination  Z01.818   2. Calculus of gallbladder without cholecystitis without obstruction  K80.20   3. HTN (hypertension)  I10   4. Controlled type 2 diabetes  mellitus without complication, without long-term current use of insulin (HC)  E11.9   5. Hypothyroidism (acquired)  E03.9   6. Iron deficiency anemia, unspecified iron deficiency anemia type  D50.9       Plan:  Sherie Long has pre-operative clearance for undergoing above-listed surgery and anesthesia.    Claire Alves MD ....................  7/21/2023   ([facility] Physician)    *Some images could not be shown.

## 2023-07-27 ENCOUNTER — ANESTHESIA EVENT (OUTPATIENT)
Dept: SURGERY | Facility: CLINIC | Age: 43
End: 2023-07-27
Payer: COMMERCIAL

## 2023-07-27 ENCOUNTER — ANESTHESIA (OUTPATIENT)
Dept: SURGERY | Facility: CLINIC | Age: 43
End: 2023-07-27
Payer: COMMERCIAL

## 2023-07-27 ENCOUNTER — APPOINTMENT (OUTPATIENT)
Dept: SURGERY | Facility: PHYSICIAN GROUP | Age: 43
End: 2023-07-27
Payer: COMMERCIAL

## 2023-07-27 ENCOUNTER — HOSPITAL ENCOUNTER (OUTPATIENT)
Facility: CLINIC | Age: 43
Discharge: HOME | End: 2023-07-27
Attending: SURGERY | Admitting: SURGERY
Payer: COMMERCIAL

## 2023-07-27 VITALS
WEIGHT: 183 LBS | TEMPERATURE: 97 F | RESPIRATION RATE: 16 BRPM | DIASTOLIC BLOOD PRESSURE: 90 MMHG | BODY MASS INDEX: 27.02 KG/M2 | OXYGEN SATURATION: 96 % | SYSTOLIC BLOOD PRESSURE: 131 MMHG | HEART RATE: 76 BPM

## 2023-07-27 DIAGNOSIS — K80.20 GALLSTONES: ICD-10-CM

## 2023-07-27 LAB
GLUCOSE BLDC GLUCOMTR-MCNC: 100 MG/DL (ref 70–99)
GLUCOSE BLDC GLUCOMTR-MCNC: 123 MG/DL (ref 70–99)

## 2023-07-27 PROCEDURE — 360N000076 HC SURGERY LEVEL 3, PER MIN: Performed by: SURGERY

## 2023-07-27 PROCEDURE — 710N000009 HC RECOVERY PHASE 1, LEVEL 1, PER MIN: Performed by: SURGERY

## 2023-07-27 PROCEDURE — 250N000025 HC SEVOFLURANE, PER MIN: Performed by: SURGERY

## 2023-07-27 PROCEDURE — 82962 GLUCOSE BLOOD TEST: CPT

## 2023-07-27 PROCEDURE — 47563 LAPARO CHOLECYSTECTOMY/GRAPH: CPT | Mod: AS | Performed by: PHYSICIAN ASSISTANT

## 2023-07-27 PROCEDURE — 250N000011 HC RX IP 250 OP 636: Performed by: ANESTHESIOLOGY

## 2023-07-27 PROCEDURE — 250N000009 HC RX 250: Performed by: SURGERY

## 2023-07-27 PROCEDURE — 250N000009 HC RX 250: Performed by: NURSE ANESTHETIST, CERTIFIED REGISTERED

## 2023-07-27 PROCEDURE — 250N000013 HC RX MED GY IP 250 OP 250 PS 637: Performed by: ANESTHESIOLOGY

## 2023-07-27 PROCEDURE — 258N000001 HC RX 258: Performed by: SURGERY

## 2023-07-27 PROCEDURE — 88304 TISSUE EXAM BY PATHOLOGIST: CPT | Mod: 26 | Performed by: PATHOLOGY

## 2023-07-27 PROCEDURE — 710N000012 HC RECOVERY PHASE 2, PER MINUTE: Performed by: SURGERY

## 2023-07-27 PROCEDURE — 258N000003 HC RX IP 258 OP 636: Performed by: ANESTHESIOLOGY

## 2023-07-27 PROCEDURE — 370N000017 HC ANESTHESIA TECHNICAL FEE, PER MIN: Performed by: SURGERY

## 2023-07-27 PROCEDURE — 47563 LAPARO CHOLECYSTECTOMY/GRAPH: CPT | Performed by: SURGERY

## 2023-07-27 PROCEDURE — 999N000141 HC STATISTIC PRE-PROCEDURE NURSING ASSESSMENT: Performed by: SURGERY

## 2023-07-27 PROCEDURE — 250N000011 HC RX IP 250 OP 636: Performed by: SURGERY

## 2023-07-27 PROCEDURE — 258N000003 HC RX IP 258 OP 636: Performed by: NURSE ANESTHETIST, CERTIFIED REGISTERED

## 2023-07-27 PROCEDURE — 250N000011 HC RX IP 250 OP 636: Performed by: NURSE ANESTHETIST, CERTIFIED REGISTERED

## 2023-07-27 PROCEDURE — 88304 TISSUE EXAM BY PATHOLOGIST: CPT | Mod: TC | Performed by: SURGERY

## 2023-07-27 PROCEDURE — 272N000001 HC OR GENERAL SUPPLY STERILE: Performed by: SURGERY

## 2023-07-27 RX ORDER — OXYCODONE HYDROCHLORIDE 5 MG/1
5-10 TABLET ORAL EVERY 4 HOURS PRN
Qty: 20 TABLET | Refills: 0 | Status: SHIPPED | OUTPATIENT
Start: 2023-07-27

## 2023-07-27 RX ORDER — DEXAMETHASONE SODIUM PHOSPHATE 4 MG/ML
INJECTION, SOLUTION INTRA-ARTICULAR; INTRALESIONAL; INTRAMUSCULAR; INTRAVENOUS; SOFT TISSUE PRN
Status: DISCONTINUED | OUTPATIENT
Start: 2023-07-27 | End: 2023-07-27

## 2023-07-27 RX ORDER — LIDOCAINE HYDROCHLORIDE 20 MG/ML
INJECTION, SOLUTION INFILTRATION; PERINEURAL PRN
Status: DISCONTINUED | OUTPATIENT
Start: 2023-07-27 | End: 2023-07-27

## 2023-07-27 RX ORDER — SODIUM CHLORIDE, SODIUM LACTATE, POTASSIUM CHLORIDE, CALCIUM CHLORIDE 600; 310; 30; 20 MG/100ML; MG/100ML; MG/100ML; MG/100ML
INJECTION, SOLUTION INTRAVENOUS CONTINUOUS PRN
Status: DISCONTINUED | OUTPATIENT
Start: 2023-07-27 | End: 2023-07-27

## 2023-07-27 RX ORDER — OXYCODONE HYDROCHLORIDE 5 MG/1
5 TABLET ORAL
Status: DISCONTINUED | OUTPATIENT
Start: 2023-07-27 | End: 2023-07-27 | Stop reason: HOSPADM

## 2023-07-27 RX ORDER — PROPOFOL 10 MG/ML
INJECTION, EMULSION INTRAVENOUS CONTINUOUS PRN
Status: DISCONTINUED | OUTPATIENT
Start: 2023-07-27 | End: 2023-07-27

## 2023-07-27 RX ORDER — INDOCYANINE GREEN AND WATER 25 MG
2.5 KIT INJECTION ONCE
Status: COMPLETED | OUTPATIENT
Start: 2023-07-27 | End: 2023-07-27

## 2023-07-27 RX ORDER — FENTANYL CITRATE 50 UG/ML
INJECTION, SOLUTION INTRAMUSCULAR; INTRAVENOUS PRN
Status: DISCONTINUED | OUTPATIENT
Start: 2023-07-27 | End: 2023-07-27

## 2023-07-27 RX ORDER — LIDOCAINE 40 MG/G
CREAM TOPICAL
Status: DISCONTINUED | OUTPATIENT
Start: 2023-07-27 | End: 2023-07-27 | Stop reason: HOSPADM

## 2023-07-27 RX ORDER — SODIUM CHLORIDE, SODIUM LACTATE, POTASSIUM CHLORIDE, CALCIUM CHLORIDE 600; 310; 30; 20 MG/100ML; MG/100ML; MG/100ML; MG/100ML
INJECTION, SOLUTION INTRAVENOUS CONTINUOUS
Status: DISCONTINUED | OUTPATIENT
Start: 2023-07-27 | End: 2023-07-27 | Stop reason: HOSPADM

## 2023-07-27 RX ORDER — OXYCODONE HYDROCHLORIDE 5 MG/1
10 TABLET ORAL
Status: DISCONTINUED | OUTPATIENT
Start: 2023-07-27 | End: 2023-07-27 | Stop reason: HOSPADM

## 2023-07-27 RX ORDER — FAMOTIDINE 20 MG
25 TABLET ORAL DAILY
COMMUNITY

## 2023-07-27 RX ORDER — NEOSTIGMINE METHYLSULFATE 1 MG/ML
VIAL (ML) INJECTION PRN
Status: DISCONTINUED | OUTPATIENT
Start: 2023-07-27 | End: 2023-07-27

## 2023-07-27 RX ORDER — CEFAZOLIN SODIUM/WATER 2 G/20 ML
2 SYRINGE (ML) INTRAVENOUS
Status: COMPLETED | OUTPATIENT
Start: 2023-07-27 | End: 2023-07-27

## 2023-07-27 RX ORDER — ONDANSETRON 4 MG/1
4 TABLET, ORALLY DISINTEGRATING ORAL EVERY 30 MIN PRN
Status: DISCONTINUED | OUTPATIENT
Start: 2023-07-27 | End: 2023-07-27 | Stop reason: HOSPADM

## 2023-07-27 RX ORDER — GLYCOPYRROLATE 0.2 MG/ML
INJECTION, SOLUTION INTRAMUSCULAR; INTRAVENOUS PRN
Status: DISCONTINUED | OUTPATIENT
Start: 2023-07-27 | End: 2023-07-27

## 2023-07-27 RX ORDER — CHOLECALCIFEROL (VITAMIN D3) 1250 MCG
1250 CAPSULE ORAL
COMMUNITY
Start: 2023-06-11

## 2023-07-27 RX ORDER — PROPOFOL 10 MG/ML
INJECTION, EMULSION INTRAVENOUS PRN
Status: DISCONTINUED | OUTPATIENT
Start: 2023-07-27 | End: 2023-07-27

## 2023-07-27 RX ORDER — FENTANYL CITRATE 50 UG/ML
50 INJECTION, SOLUTION INTRAMUSCULAR; INTRAVENOUS EVERY 10 MIN PRN
Status: COMPLETED | OUTPATIENT
Start: 2023-07-27 | End: 2023-07-27

## 2023-07-27 RX ORDER — CEFAZOLIN SODIUM/WATER 2 G/20 ML
2 SYRINGE (ML) INTRAVENOUS SEE ADMIN INSTRUCTIONS
Status: DISCONTINUED | OUTPATIENT
Start: 2023-07-27 | End: 2023-07-27 | Stop reason: HOSPADM

## 2023-07-27 RX ORDER — ONDANSETRON 2 MG/ML
4 INJECTION INTRAMUSCULAR; INTRAVENOUS EVERY 30 MIN PRN
Status: DISCONTINUED | OUTPATIENT
Start: 2023-07-27 | End: 2023-07-27 | Stop reason: HOSPADM

## 2023-07-27 RX ORDER — ONDANSETRON 2 MG/ML
INJECTION INTRAMUSCULAR; INTRAVENOUS PRN
Status: DISCONTINUED | OUTPATIENT
Start: 2023-07-27 | End: 2023-07-27

## 2023-07-27 RX ORDER — ONDANSETRON 4 MG/1
4 TABLET, ORALLY DISINTEGRATING ORAL EVERY 8 HOURS PRN
Qty: 10 TABLET | Refills: 0 | Status: SHIPPED | OUTPATIENT
Start: 2023-07-27

## 2023-07-27 RX ORDER — BUPIVACAINE HYDROCHLORIDE AND EPINEPHRINE 5; 5 MG/ML; UG/ML
INJECTION, SOLUTION PERINEURAL PRN
Status: DISCONTINUED | OUTPATIENT
Start: 2023-07-27 | End: 2023-07-27 | Stop reason: HOSPADM

## 2023-07-27 RX ORDER — KETOROLAC TROMETHAMINE 30 MG/ML
INJECTION, SOLUTION INTRAMUSCULAR; INTRAVENOUS PRN
Status: DISCONTINUED | OUTPATIENT
Start: 2023-07-27 | End: 2023-07-27

## 2023-07-27 RX ADMIN — BENZOCAINE 6 MG-MENTHOL 10 MG LOZENGES 1 LOZENGE: at 14:36

## 2023-07-27 RX ADMIN — FENTANYL CITRATE 100 MCG: 50 INJECTION, SOLUTION INTRAMUSCULAR; INTRAVENOUS at 13:52

## 2023-07-27 RX ADMIN — ONDANSETRON 4 MG: 2 INJECTION INTRAMUSCULAR; INTRAVENOUS at 13:52

## 2023-07-27 RX ADMIN — GLYCOPYRROLATE 0.6 MG: 0.2 INJECTION, SOLUTION INTRAMUSCULAR; INTRAVENOUS at 13:58

## 2023-07-27 RX ADMIN — PROPOFOL 160 MG: 10 INJECTION, EMULSION INTRAVENOUS at 13:14

## 2023-07-27 RX ADMIN — FENTANYL CITRATE 50 MCG: 50 INJECTION, SOLUTION INTRAMUSCULAR; INTRAVENOUS at 12:23

## 2023-07-27 RX ADMIN — SODIUM CHLORIDE, POTASSIUM CHLORIDE, SODIUM LACTATE AND CALCIUM CHLORIDE: 600; 310; 30; 20 INJECTION, SOLUTION INTRAVENOUS at 13:52

## 2023-07-27 RX ADMIN — LIDOCAINE HYDROCHLORIDE 50 MG: 20 INJECTION, SOLUTION INFILTRATION; PERINEURAL at 13:14

## 2023-07-27 RX ADMIN — FENTANYL CITRATE 100 MCG: 50 INJECTION, SOLUTION INTRAMUSCULAR; INTRAVENOUS at 13:12

## 2023-07-27 RX ADMIN — DEXAMETHASONE SODIUM PHOSPHATE 4 MG: 4 INJECTION, SOLUTION INTRA-ARTICULAR; INTRALESIONAL; INTRAMUSCULAR; INTRAVENOUS; SOFT TISSUE at 13:14

## 2023-07-27 RX ADMIN — KETOROLAC TROMETHAMINE 30 MG: 30 INJECTION, SOLUTION INTRAMUSCULAR at 13:52

## 2023-07-27 RX ADMIN — SODIUM CHLORIDE, POTASSIUM CHLORIDE, SODIUM LACTATE AND CALCIUM CHLORIDE: 600; 310; 30; 20 INJECTION, SOLUTION INTRAVENOUS at 12:23

## 2023-07-27 RX ADMIN — PROPOFOL 50 MCG/KG/MIN: 10 INJECTION, EMULSION INTRAVENOUS at 13:22

## 2023-07-27 RX ADMIN — NEOSTIGMINE METHYLSULFATE 4 MG: 1 INJECTION, SOLUTION INTRAVENOUS at 13:58

## 2023-07-27 RX ADMIN — ROCURONIUM BROMIDE 40 MG: 50 INJECTION, SOLUTION INTRAVENOUS at 13:14

## 2023-07-27 RX ADMIN — MIDAZOLAM 2 MG: 1 INJECTION INTRAMUSCULAR; INTRAVENOUS at 13:07

## 2023-07-27 RX ADMIN — FENTANYL CITRATE 50 MCG: 50 INJECTION, SOLUTION INTRAMUSCULAR; INTRAVENOUS at 12:00

## 2023-07-27 RX ADMIN — SODIUM CHLORIDE, POTASSIUM CHLORIDE, SODIUM LACTATE AND CALCIUM CHLORIDE: 600; 310; 30; 20 INJECTION, SOLUTION INTRAVENOUS at 13:07

## 2023-07-27 RX ADMIN — INDOCYANINE GREEN AND WATER 2.5 MG: KIT at 12:15

## 2023-07-27 RX ADMIN — Medication 2 G: at 13:07

## 2023-07-27 ASSESSMENT — ACTIVITIES OF DAILY LIVING (ADL)
ADLS_ACUITY_SCORE: 35

## 2023-07-27 ASSESSMENT — ENCOUNTER SYMPTOMS: DYSRHYTHMIAS: 0

## 2023-07-27 NOTE — OP NOTE
General Surgery Operative Note    Pre-operative diagnosis:  Gallstones [K80.20]   Post-operative diagnosis: same   Procedure: Laparoscopic Cholecystectomy   Surgeon: Cm Callahan MD   Assistant(s): Tutu Beyer PA-C - the physician assistant was medically necessary to assist in prepping, positioning, camera operation, retraction/exposure and closure of the port site.    Anesthesia: General    Estimated blood loss: 5 cc's   Drains placed: None   Complications:  None   Findings:  Gallbladder filled with stones     INDICATIONS FOR OPERATION: This is a patient with upper abdominal pain and gallstones.  Laparoscopic cholecystectomy was recommended.  The procedure along with its risks and complications was discussed in detail and the patient agreed to proceed.    DETAILS OF THE OPERATION: After informed consent the patient was taken to the operating room where she underwent satisfactory induction of general anesthesia.  The patient was then sterilely prepped and draped.  A supraumbilical skin incision was made using a skin knife.  The dissection was carried bluntly down to the fascia.  The fascia was opened using electrocautery and the Duque trocar was then inserted.  Pneumoperitoneum was achieved using CO2 insufflation, and under direct visualization  three  5 mm upper abdominal ports were placed.  The gallbladder was visualized and was grasped. It was pulled up over the liver and the cystic duct was exposed.  ICG fluorescence imaging was used to facilitate dissection and identification of structures.  The cystic duct was skeletonized, triple clipped and divided.  The cystic artery was likewise triple clipped and divided.  The gallbladder was then dissected away from the liver using electrocautery.  The gallbladder was then placed in an Endo Catch bag and removed through the supraumbilical incision.  The gallbladder fossa was irrigated out.  There was excellent hemostasis and the clips were in good position.   The trocar sites were now infiltrated with half percent Marcaine with epinephrine.  The trochars were removed under direct visualization.  The supraumbilical fascia was then closed using 0 Vicryl suture.  Skin incisions were closed using 4-0 subcuticular Vicryl followed by Steri-Strips.    The patient was transferred to the recovery room in satisfactory condition.  Sponge and needle counts were correct at the close of the case.      Specimens:   ID Type Source Tests Collected by Time Destination   1 : GALLBLADDER AND CONTENTS Tissue Gallbladder SURGICAL PATHOLOGY EXAM Cm Callahan MD 7/27/2023  1:52 PM            Cm Callahan MD

## 2023-07-27 NOTE — ANESTHESIA PROCEDURE NOTES
Airway       Patient location during procedure: OR       Procedure Start/Stop Times: 7/27/2023 1:17 PM  Staff -        CRNA: Maximilian Nicolas APRN CRNA       Performed By: CRNA  Consent for Airway        Urgency: elective  Indications and Patient Condition       Indications for airway management: katrin-procedural       Induction type:intravenous       Mask difficulty assessment: 1 - vent by mask    Final Airway Details       Final airway type: endotracheal airway       Successful airway: Oral and ETT - single  Endotracheal Airway Details        ETT size (mm): 7.0       Cuffed: yes       Successful intubation technique: direct laryngoscopy       DL Blade Type: Daigle 2       Grade View of Cords: 1       Adjucts: stylet       Position: Right       Measured from: gums/teeth       Secured at (cm): 22       Bite block used: None    Post intubation assessment        Placement verified by: capnometry, equal breath sounds and chest rise        Number of attempts at approach: 1       Number of other approaches attempted: 0       Secured with: plastic tape       Ease of procedure: easy       Dentition: Intact    Medication(s) Administered   Medication Administration Time: 7/27/2023 1:17 PM

## 2023-07-27 NOTE — ANESTHESIA CARE TRANSFER NOTE
Patient: Sherie Carpenter    Procedure: Procedure(s):  CHOLECYSTECTOMY, LAPAROSCOPIC       Diagnosis: Gallstones [K80.20]  Diagnosis Additional Information: No value filed.    Anesthesia Type:   General     Note:    Oropharynx: oropharynx clear of all foreign objects, oral airway in place and spontaneously breathing  Level of Consciousness: drowsy  Oxygen Supplementation: face mask  Level of Supplemental Oxygen (L/min / FiO2): 6  Independent Airway: airway patency satisfactory and stable  Dentition: dentition unchanged  Vital Signs Stable: post-procedure vital signs reviewed and stable  Report to RN Given: handoff report given  Patient transferred to: PACU    Handoff Report: Identifed the Patient, Identified the Reponsible Provider, Reviewed the pertinent medical history, Discussed the surgical course, Reviewed Intra-OP anesthesia mangement and issues during anesthesia, Set expectations for post-procedure period and Allowed opportunity for questions and acknowledgement of understanding      Vitals:  Vitals Value Taken Time   /71 07/27/23 1408   Temp     Pulse 74 07/27/23 1408   Resp 17 07/27/23 1410   SpO2 95 % 07/27/23 1410   Vitals shown include unvalidated device data.    Electronically Signed By: SHERRILL Oconnor CRNA  July 27, 2023  2:11 PM

## 2023-07-27 NOTE — ANESTHESIA PREPROCEDURE EVALUATION
Anesthesia Pre-Procedure Evaluation    Patient: Sherie Carpenter   MRN: [id number] : 1980        Procedure : Procedure(s):  CHOLECYSTECTOMY, LAPAROSCOPIC          Past Medical History:   Diagnosis Date    Diabetes (H)     HTN (hypertension)     Hypothyroidism     Sleep apnea       Past Surgical History:   Procedure Laterality Date     SECTION  2013    D & C      D & C      TUBAL LIGATION        No Known Allergies   Social History     Tobacco Use    Smoking status: Never     Passive exposure: Never    Smokeless tobacco: Never   Substance Use Topics    Alcohol use: Yes     Comment: 5 per year      Wt Readings from Last 1 Encounters:   23 83 kg (183 lb)        Anesthesia Evaluation            ROS/MED HX  ENT/Pulmonary:     (+) sleep apnea,                                      Neurologic:  - neg neurologic ROS     Cardiovascular:     (+) Dyslipidemia hypertension- -   -  - -                                   (-) CAD, arrhythmias and pulmonary hypertension   METS/Exercise Tolerance:     Hematologic:     (+)      anemia,          Musculoskeletal:   (+)  arthritis,             GI/Hepatic:     (+)          cholecystitis/cholelithiasis,       (-) GERD   Renal/Genitourinary:       Endo:     (+)  type II DM,        thyroid problem, hypothyroidism,           Psychiatric/Substance Use:  - neg psychiatric ROS     Infectious Disease:       Malignancy:       Other:            Physical Exam    Airway        Mallampati: II   TM distance: > 3 FB   Neck ROM: full   Mouth opening: > 3 cm    Respiratory Devices and Support         Dental           Cardiovascular   cardiovascular exam normal          Pulmonary   pulmonary exam normal            Other findings: Lab Test        19                       1649          WBC          6.1           HGB          9.3*          MCV          68*           PLT          282            Lab Test        23                        1105          1649          NA            --          133           POTASSIUM     --          3.5           CHLORIDE      --          102           CO2           --          22            BUN           --          11            CR            --          0.54          ANIONGAP      --          9             NIELS           --          8.0*          GLC          100*         185*              OUTSIDE LABS:  CBC:   Lab Results   Component Value Date    WBC 6.1 01/12/2019    HGB 9.3 (L) 01/12/2019    HCT 34.4 (L) 01/12/2019     01/12/2019     BMP:   Lab Results   Component Value Date     01/12/2019    POTASSIUM 3.5 01/12/2019    CHLORIDE 102 01/12/2019    CO2 22 01/12/2019    BUN 11 01/12/2019    CR 0.54 01/12/2019     (H) 07/27/2023     (H) 01/12/2019     COAGS: No results found for: PTT, INR, FIBR  POC: No results found for: BGM, HCG, HCGS  HEPATIC:   Lab Results   Component Value Date    ALBUMIN 3.7 01/12/2019    PROTTOTAL 7.6 01/12/2019    ALT 65 (H) 01/12/2019    AST 67 (H) 01/12/2019    ALKPHOS 79 01/12/2019    BILITOTAL 0.7 01/12/2019     OTHER:   Lab Results   Component Value Date    NIELS 8.0 (L) 01/12/2019    LIPASE 82 01/12/2019       Anesthesia Plan    ASA Status:  3       Anesthesia Type: General.     - Airway: ETT   Induction: Propofol.   Maintenance: Balanced.        Consents    Anesthesia Plan(s) and associated risks, benefits, and realistic alternatives discussed. Questions answered and patient/representative(s) expressed understanding.     - Discussed:     - Discussed with:  Patient      - Extended Intubation/Ventilatory Support Discussed: No.      - Patient is DNR/DNI Status: No     Use of blood products discussed: No .     Postoperative Care    Pain management: IV analgesics, Oral pain medications.   PONV prophylaxis: Ondansetron (or other 5HT-3), Background Propofol Infusion     Comments:                Collin Ruby MD

## 2023-07-27 NOTE — ANESTHESIA POSTPROCEDURE EVALUATION
Patient: Sherie Carpenter    Procedure: Procedure(s):  CHOLECYSTECTOMY, LAPAROSCOPIC       Anesthesia Type:  General    Note:  Disposition: Outpatient   Postop Pain Control: Uneventful            Sign Out: Well controlled pain   PONV: No   Neuro/Psych: Uneventful            Sign Out: Acceptable/Baseline neuro status   Airway/Respiratory: Uneventful            Sign Out: Acceptable/Baseline resp. status   CV/Hemodynamics: Uneventful            Sign Out: Acceptable CV status; No obvious hypovolemia; No obvious fluid overload   Other NRE:    DID A NON-ROUTINE EVENT OCCUR?            Last vitals:  Vitals Value Taken Time   /89 07/27/23 1450   Temp 96.6  F (35.9  C) 07/27/23 1410   Pulse 78 07/27/23 1453   Resp 17 07/27/23 1453   SpO2 96 % 07/27/23 1453   Vitals shown include unvalidated device data.    Electronically Signed By: Collin Ruby MD  July 27, 2023  2:54 PM

## 2023-07-27 NOTE — DISCHARGE INSTRUCTIONS
HOME CARE FOLLOWING LAPAROSCOPIC CHOLECYSTECTOMY  CHARLI Oh, RENETTA Jean Baptiste C. Pratt, J. Shaheen    INCISIONAL CARE:  Replace the bandage over your incisions DAILY until all drainage stops, or if more comfortable to have in place.  If present, leave the steri-strips (white paper tapes) in place for 14 days after surgery.  If Dermabond (a type of skin glue) is present, leave in place until it wears/flakes off (2-3 weeks).     BATHING:  OK to shower 48 hours after surgery.  Avoid baths for 1 week after surgery.  You may wash your hair at any time.  Gently pat your incision dry after bathing.  Do not apply lotions, creams, or ointments to incisions.    ACTIVITY:  Light Activity -- you may immediately be up and about as tolerated.  Walking is encouraged, increase as tolerated.  Driving/Light Work-- when comfortable and off narcotic pain medications.  Strenuous Work/Activity -- limit lifting to 20 pounds for 2 weeks.  Progressively increase with time.  Active Sports (running, biking, etc.) -- cautiously resume after 2 weeks.    DISCOMFORT:  Local anesthetic placed at surgery should provide relief for 4-8 hours.  Begin taking pain pills before discomfort is severe.  Take the pain medication with some food, when possible, to minimize side effects.  Intermittent use of ice packs may help during the first 1-3 weeks after surgery.  Expect gradual improvement.    Over-the-counter anti-inflammatory medications (i.e. Ibuprofen/Advil/Motrin or Naprosyn/Aleve) may be used per package instructions in addition to or while tapering off the narcotic pain medications to decrease swelling and sensitivity.  DO NOT TAKE these Anti-inflammatory medications if your primary physician has advised against doing so, or if you have acid reflux, ulcer, or bleeding disorder, or take blood-thinner medications.  Call your primary physician or the surgery office if you have medication questions.    After laparoscopic  cholecystectomy, you may have shoulder or upper back discomfort due to the gas used during surgery.  This is temporary and should resolve within 2-3 days.  Frequent short walks may help with this.  You may have decreased energy level for 1-2 weeks after surgery related to your recovery.    DIET:  Start with liquids and gradually increase diet as tolerated.  Drink plenty of fluids.  While taking pain medications, consider use of a stool softener, increase your fiber in your diet, or add a fiber supplement (like Metamucil, Citrucel) to help prevent constipation - a possible side effect of pain medications.  It is not uncommon to experience some bowel changes (loose stools or constipation) after surgery.  Your body has to adapt to you no longer having a gall bladder.  To help minimize this side effect, avoid fatty foods for 1-2 weeks after surgery.  You may then slowly increase the amount of fatty foods in your diet.      NAUSEA:  If nauseated from the anesthetic/pain meds; rest in bed, get up cautiously with assistance, and drink clear liquids (juice, tea, broth).    FOLLOW-UP AFTER SURGERY:  -Our office will contact you approximately 2-3 weeks after surgery to check on your progress and answer any questions you may have.  If you are doing well, you will not need to return for an office appointment.  If any concerns are identified over the phone, we will help you make an appointment to see a provider.    -If you have not received a phone call, have any questions or concerns, or would like to be seen, please call us at [phone].  We are located at: [address], [address]; [place] [address]    -CONTACT US IF THE FOLLOWING DEVELOPS:   1. A fever that is above 101     2. Increased redness, warmth, drainage, bleeding, or swelling.   3. Pain that is not relieved by rest/ice and your prescription.   4.  Increasing pain after 48 hours.   5. Drainage that is thick, cloudy, yellow, green or white.   6. Any other  questions or concerns.      GENERAL ANESTHESIA OR SEDATION ADULT DISCHARGE INSTRUCTIONS   SPECIAL PRECAUTIONS FOR 24 HOURS AFTER SURGERY    IT IS NOT UNUSUAL TO FEEL LIGHT-HEADED OR FAINT, UP TO 24 HOURS AFTER SURGERY OR WHILE TAKING PAIN MEDICATION.  IF YOU HAVE THESE SYMPTOMS; SIT FOR A FEW MINUTES BEFORE STANDING AND HAVE SOMEONE ASSIST YOU WHEN YOU GET UP TO WALK OR USE THE BATHROOM.    YOU SHOULD REST AND RELAX FOR THE NEXT 24 HOURS AND YOU MUST MAKE ARRANGEMENTS TO HAVE SOMEONE STAY WITH YOU FOR AT LEAST 24 HOURS AFTER YOUR DISCHARGE.  AVOID HAZARDOUS AND STRENUOUS ACTIVITIES.  DO NOT MAKE IMPORTANT DECISIONS FOR 24 HOURS.    DO NOT DRIVE ANY VEHICLE OR OPERATE MECHANICAL EQUIPMENT FOR 24 HOURS FOLLOWING THE END OF YOUR SURGERY.  EVEN THOUGH YOU MAY FEEL NORMAL, YOUR REACTIONS MAY BE AFFECTED BY THE MEDICATION YOU HAVE RECEIVED.    DO NOT DRINK ALCOHOLIC BEVERAGES FOR 24 HOURS FOLLOWING YOUR SURGERY.    DRINK CLEAR LIQUIDS (APPLE JUICE, GINGER ALE, 7-UP, BROTH, ETC.).  PROGRESS TO YOUR REGULAR DIET AS YOU FEEL ABLE.    YOU MAY HAVE A DRY MOUTH, A SORE THROAT, MUSCLES ACHES OR TROUBLE SLEEPING.  THESE SHOULD GO AWAY AFTER 24 HOURS.    CALL YOUR DOCTOR FOR ANY OF THE FOLLOWING:  SIGNS OF INFECTION (FEVER, GROWING TENDERNESS AT THE SURGERY SITE, A LARGE AMOUNT OF DRAINAGE OR BLEEDING, SEVERE PAIN, FOUL-SMELLING DRAINAGE, REDNESS OR SWELLING.    IT HAS BEEN OVER 8 TO 10 HOURS SINCE SURGERY AND YOU ARE STILL NOT ABLE TO URINATE (PASS WATER).     You received Toradol, an IV form of Ibuprofen (Motrin) at 2PM.  Do not take any Ibuprofen products until 8PM.    Maximum acetaminophen (Tylenol) dose from all sources should not exceed 4 grams (4000 mg) per day.

## 2023-07-28 LAB
PATH REPORT.COMMENTS IMP SPEC: NORMAL
PATH REPORT.COMMENTS IMP SPEC: NORMAL
PATH REPORT.FINAL DX SPEC: NORMAL
PATH REPORT.GROSS SPEC: NORMAL
PATH REPORT.MICROSCOPIC SPEC OTHER STN: NORMAL
PATH REPORT.RELEVANT HX SPEC: NORMAL
PHOTO IMAGE: NORMAL

## 2023-08-16 ENCOUNTER — TELEPHONE (OUTPATIENT)
Dept: SURGERY | Facility: CLINIC | Age: 43
End: 2023-08-16
Payer: COMMERCIAL

## 2023-08-16 NOTE — TELEPHONE ENCOUNTER
Attempted to call patient for post op check. No answer.  A message was left for patient to call back if they had any questions or concerns.     Tutu Beyer PA-C

## 2024-02-25 ENCOUNTER — HEALTH MAINTENANCE LETTER (OUTPATIENT)
Age: 44
End: 2024-02-25

## 2024-05-05 ENCOUNTER — HEALTH MAINTENANCE LETTER (OUTPATIENT)
Age: 44
End: 2024-05-05

## 2025-05-17 ENCOUNTER — HEALTH MAINTENANCE LETTER (OUTPATIENT)
Age: 45
End: 2025-05-17

## (undated) DEVICE — CLIP APPLIER ENDO 5MM M/L LIGAMAX EL5ML

## (undated) DEVICE — SUCTION CANISTER MEDIVAC LINER 3000ML W/LID 65651-530

## (undated) DEVICE — ESU GROUND PAD ADULT W/CORD E7507

## (undated) DEVICE — LINEN HALF SHEET 5512

## (undated) DEVICE — LINEN FULL SHEET 5511

## (undated) DEVICE — LINEN POUCH DBL 5427

## (undated) DEVICE — ENDO POUCH UNIV RETRIEVAL SYSTEM INZII 10MM CD001

## (undated) DEVICE — ESU CORD MONOPOLAR 10'  E0510

## (undated) DEVICE — SU VICRYL 4-0 P-3 18" UND J494G

## (undated) DEVICE — GLOVE BIOGEL PI MICRO SZ 8.0 48580

## (undated) DEVICE — LINEN TOWEL PACK X5 5464

## (undated) DEVICE — SOL NACL 0.9% IRRIG 3000ML BAG 2B7477

## (undated) DEVICE — ENDO TROCAR OPTICAL ACCESS KII Z-THRD 12X100MM C0R85

## (undated) DEVICE — ENDO TROCAR FIRST ENTRY KII FIOS Z-THRD 05X100MM CTF03

## (undated) DEVICE — GLOVE BIOGEL PI MICRO SZ 7.5 48575

## (undated) DEVICE — Device

## (undated) DEVICE — SU VICRYL 0 CT-2 CR 8X18" J727D

## (undated) DEVICE — ESU PENCIL W/HOLSTER E2350H

## (undated) DEVICE — BAG CLEAR TRASH 1.3M 39X33" P4040C

## (undated) DEVICE — ESU ELEC BLADE 2.75" COATED/INSULATED E1455

## (undated) DEVICE — ENDO TROCAR SLEEVE KII Z-THREADED 05X100MM CTS02

## (undated) DEVICE — SUCTION IRR STRYKERFLOW II W/TIP 250-070-520

## (undated) RX ORDER — FENTANYL CITRATE 50 UG/ML
INJECTION, SOLUTION INTRAMUSCULAR; INTRAVENOUS
Status: DISPENSED
Start: 2023-07-27

## (undated) RX ORDER — DEXAMETHASONE SODIUM PHOSPHATE 4 MG/ML
INJECTION, SOLUTION INTRA-ARTICULAR; INTRALESIONAL; INTRAMUSCULAR; INTRAVENOUS; SOFT TISSUE
Status: DISPENSED
Start: 2023-07-27

## (undated) RX ORDER — BUPIVACAINE HYDROCHLORIDE AND EPINEPHRINE 5; 5 MG/ML; UG/ML
INJECTION, SOLUTION EPIDURAL; INTRACAUDAL; PERINEURAL
Status: DISPENSED
Start: 2023-07-27

## (undated) RX ORDER — CEFAZOLIN SODIUM/WATER 2 G/20 ML
SYRINGE (ML) INTRAVENOUS
Status: DISPENSED
Start: 2023-07-27

## (undated) RX ORDER — GLYCOPYRROLATE 0.2 MG/ML
INJECTION INTRAMUSCULAR; INTRAVENOUS
Status: DISPENSED
Start: 2023-07-27

## (undated) RX ORDER — KETOROLAC TROMETHAMINE 30 MG/ML
INJECTION, SOLUTION INTRAMUSCULAR; INTRAVENOUS
Status: DISPENSED
Start: 2023-07-27

## (undated) RX ORDER — INDOCYANINE GREEN AND WATER 25 MG
KIT INJECTION
Status: DISPENSED
Start: 2023-07-27

## (undated) RX ORDER — NEOSTIGMINE METHYLSULFATE 1 MG/ML
VIAL (ML) INJECTION
Status: DISPENSED
Start: 2023-07-27

## (undated) RX ORDER — ONDANSETRON 2 MG/ML
INJECTION INTRAMUSCULAR; INTRAVENOUS
Status: DISPENSED
Start: 2023-07-27

## (undated) RX ORDER — PROPOFOL 10 MG/ML
INJECTION, EMULSION INTRAVENOUS
Status: DISPENSED
Start: 2023-07-27

## (undated) RX ORDER — LIDOCAINE HYDROCHLORIDE 10 MG/ML
INJECTION, SOLUTION EPIDURAL; INFILTRATION; INTRACAUDAL; PERINEURAL
Status: DISPENSED
Start: 2023-07-27